# Patient Record
Sex: MALE | Race: BLACK OR AFRICAN AMERICAN | NOT HISPANIC OR LATINO | URBAN - METROPOLITAN AREA
[De-identification: names, ages, dates, MRNs, and addresses within clinical notes are randomized per-mention and may not be internally consistent; named-entity substitution may affect disease eponyms.]

---

## 2021-01-21 ENCOUNTER — HOSPITAL ENCOUNTER (EMERGENCY)
Facility: HOSPITAL | Age: 26
Discharge: HOME | End: 2021-01-21
Attending: EMERGENCY MEDICINE
Payer: OTHER GOVERNMENT

## 2021-01-21 VITALS
DIASTOLIC BLOOD PRESSURE: 85 MMHG | HEART RATE: 95 BPM | SYSTOLIC BLOOD PRESSURE: 128 MMHG | OXYGEN SATURATION: 100 % | RESPIRATION RATE: 18 BRPM | WEIGHT: 189.15 LBS | TEMPERATURE: 97.3 F

## 2021-01-21 DIAGNOSIS — Z00.8 MEDICAL CLEARANCE FOR INCARCERATION: Primary | ICD-10-CM

## 2021-01-21 DIAGNOSIS — F12.10 MARIJUANA ABUSE: ICD-10-CM

## 2021-01-21 PROCEDURE — 99282 EMERGENCY DEPT VISIT SF MDM: CPT | Performed by: PHYSICIAN ASSISTANT

## 2021-01-21 PROCEDURE — 99283 EMERGENCY DEPT VISIT LOW MDM: CPT

## 2021-01-21 NOTE — ED PROVIDER NOTES
History  Chief Complaint   Patient presents with    Medical Problem     Patient brought in with police for medical clearance to go back to correction after eating an edible marijuana brownie  Patient evaluated earlier by [facility] [place] and discharged, but between then and here he ate this brownie  Patient presents with police for medical clearance for incarceration  Patient was apparently at another facility and already medically cleared for incarceration, but discharge instructions did not specifically include "medically cleared for incarceration"  Patient had apparently consumed several marijuana brownies earlier in the day and had been placed under arrest  He denies any headache, dizziness, chest pain, shortness of breath, fever, chills, abdominal pain, nausea, vomiting  He is making threats of suicide and is belligerent towards myself and staff  Denies having a plan  Repeatedly threatening me "I'm gonna come find you when I get out"  Patient also making sexual advances on some of the nursing staff  Repeatedly calling ED staff "bitch"  None       Past Medical History:   Diagnosis Date    Anxiety     Depression        History reviewed  No pertinent surgical history  History reviewed  No pertinent family history  I have reviewed and agree with the history as documented  E-Cigarette/Vaping    E-Cigarette Use Never User      E-Cigarette/Vaping Substances     Social History     Tobacco Use    Smoking status: Current Every Day Smoker     Packs/day: 0 50    Smokeless tobacco: Never Used   Substance Use Topics    Alcohol use: Yes     Frequency: 4 or more times a week     Comment: drinks weekends    Drug use: Yes     Types: Marijuana       Review of Systems   Constitutional: Negative for chills and fever  HENT: Negative for congestion, ear pain and sore throat  Eyes: Negative for pain  Respiratory: Negative for cough and shortness of breath  Cardiovascular: Negative for chest pain  Gastrointestinal: Negative for abdominal pain, nausea and vomiting  Genitourinary: Negative for dysuria  Musculoskeletal: Negative for back pain  Skin: Negative for rash  Neurological: Negative for dizziness, weakness and numbness  Psychiatric/Behavioral: Positive for suicidal ideas  All other systems reviewed and are negative  Physical Exam  Physical Exam  Vitals signs reviewed  Constitutional:       General: He is not in acute distress  Appearance: He is well-developed  He is not diaphoretic  Comments: Handcuffed, belligerent, threatening myself and ED staff   HENT:      Head: Normocephalic and atraumatic  Right Ear: External ear normal       Left Ear: External ear normal       Nose: Nose normal    Eyes:      Extraocular Movements: Extraocular movements intact  Pupils: Pupils are equal, round, and reactive to light  Neck:      Musculoskeletal: Normal range of motion and neck supple  Cardiovascular:      Rate and Rhythm: Normal rate and regular rhythm  Heart sounds: Normal heart sounds  Pulmonary:      Effort: Pulmonary effort is normal       Breath sounds: Normal breath sounds  Abdominal:      General: Bowel sounds are normal       Palpations: Abdomen is soft  Tenderness: There is no abdominal tenderness  Musculoskeletal: Normal range of motion  Skin:     General: Skin is warm and dry  Neurological:      Mental Status: He is alert and oriented to person, place, and time  Psychiatric:         Mood and Affect: Affect is labile and angry  Speech: Speech normal          Behavior: Behavior is uncooperative, agitated, aggressive and combative  Thought Content: Thought content includes suicidal ideation  Thought content does not include homicidal ideation  Thought content does not include homicidal or suicidal plan           Vital Signs  ED Triage Vitals [01/21/21 0415]   Temperature Pulse Respirations Blood Pressure SpO2   (!) 97 3 °F (36 3 °C) 95 18 128/85 100 %      Temp Source Heart Rate Source Patient Position - Orthostatic VS BP Location FiO2 (%)   Tympanic Monitor Sitting Left arm --      Pain Score       --           Vitals:    01/21/21 0415   BP: 128/85   Pulse: 95   Patient Position - Orthostatic VS: Sitting         Visual Acuity      ED Medications  Medications - No data to display    Diagnostic Studies  Results Reviewed     None                 No orders to display              Procedures  Procedures         ED Course                                           MDM  Number of Diagnoses or Management Options  Marijuana abuse:   Medical clearance for incarceration:   Diagnosis management comments: Patient medically cleared for incarceration  Escorted to [facility] by police  Disposition  Final diagnoses:   Medical clearance for incarceration   Marijuana abuse     Time reflects when diagnosis was documented in both MDM as applicable and the Disposition within this note     Time User Action Codes Description Comment    1/21/2021  4:16 AM Evelyn May Add [Z00 8] Medical clearance for incarceration     1/21/2021  4:16 AM Evelyn May Add [F12 10] Marijuana abuse       ED Disposition     ED Disposition Condition Date/Time Comment    Discharge Stable Thu Jan 21, 2021  4:16 AM Roque Matthews discharge to home/self care  Follow-up Information     Follow up With Specialties Details [address]  [address] [address]  [address] [facility]  [phone]            There are no discharge medications for this patient  No discharge procedures on file      PDMP Review     None          ED Provider  Electronically Signed by           Gearldean Brittle, PA-C  01/21/21 9249